# Patient Record
Sex: FEMALE | Race: WHITE | NOT HISPANIC OR LATINO
[De-identification: names, ages, dates, MRNs, and addresses within clinical notes are randomized per-mention and may not be internally consistent; named-entity substitution may affect disease eponyms.]

---

## 2018-04-16 ENCOUNTER — RX RENEWAL (OUTPATIENT)
Age: 28
End: 2018-04-16

## 2018-04-16 PROBLEM — Z00.00 ENCOUNTER FOR PREVENTIVE HEALTH EXAMINATION: Status: ACTIVE | Noted: 2018-04-16

## 2018-04-16 RX ORDER — NORGESTIMATE AND ETHINYL ESTRADIOL 7DAYSX3 LO
0.18/0.215/0.25 KIT ORAL
Qty: 84 | Refills: 1 | Status: ACTIVE | COMMUNITY
Start: 2018-04-16 | End: 1900-01-01

## 2018-12-13 ENCOUNTER — APPOINTMENT (OUTPATIENT)
Dept: OBGYN | Facility: CLINIC | Age: 28
End: 2018-12-13

## 2021-01-10 ENCOUNTER — TRANSCRIPTION ENCOUNTER (OUTPATIENT)
Age: 31
End: 2021-01-10

## 2022-04-12 ENCOUNTER — INPATIENT (INPATIENT)
Facility: HOSPITAL | Age: 32
LOS: 0 days | Discharge: HOME | End: 2022-04-13
Attending: STUDENT IN AN ORGANIZED HEALTH CARE EDUCATION/TRAINING PROGRAM | Admitting: STUDENT IN AN ORGANIZED HEALTH CARE EDUCATION/TRAINING PROGRAM
Payer: COMMERCIAL

## 2022-04-12 VITALS
RESPIRATION RATE: 17 BRPM | HEART RATE: 145 BPM | WEIGHT: 250 LBS | DIASTOLIC BLOOD PRESSURE: 90 MMHG | SYSTOLIC BLOOD PRESSURE: 140 MMHG | OXYGEN SATURATION: 99 % | TEMPERATURE: 99 F

## 2022-04-12 LAB
ALBUMIN SERPL ELPH-MCNC: 4.7 G/DL — SIGNIFICANT CHANGE UP (ref 3.5–5.2)
ALP SERPL-CCNC: 115 U/L — SIGNIFICANT CHANGE UP (ref 30–115)
ALT FLD-CCNC: 18 U/L — SIGNIFICANT CHANGE UP (ref 0–41)
ANION GAP SERPL CALC-SCNC: 16 MMOL/L — HIGH (ref 7–14)
AST SERPL-CCNC: 15 U/L — SIGNIFICANT CHANGE UP (ref 0–41)
B-OH-BUTYR SERPL-SCNC: 1.2 MMOL/L — HIGH
BASOPHILS # BLD AUTO: 0.01 K/UL — SIGNIFICANT CHANGE UP (ref 0–0.2)
BASOPHILS NFR BLD AUTO: 0.1 % — SIGNIFICANT CHANGE UP (ref 0–1)
BILIRUB SERPL-MCNC: 0.4 MG/DL — SIGNIFICANT CHANGE UP (ref 0.2–1.2)
BUN SERPL-MCNC: 9 MG/DL — LOW (ref 10–20)
CALCIUM SERPL-MCNC: 9.8 MG/DL — SIGNIFICANT CHANGE UP (ref 8.5–10.1)
CHLORIDE SERPL-SCNC: 98 MMOL/L — SIGNIFICANT CHANGE UP (ref 98–110)
CO2 SERPL-SCNC: 20 MMOL/L — SIGNIFICANT CHANGE UP (ref 17–32)
CREAT SERPL-MCNC: 0.7 MG/DL — SIGNIFICANT CHANGE UP (ref 0.7–1.5)
EGFR: 120 ML/MIN/1.73M2 — SIGNIFICANT CHANGE UP
EOSINOPHIL # BLD AUTO: 0.03 K/UL — SIGNIFICANT CHANGE UP (ref 0–0.7)
EOSINOPHIL NFR BLD AUTO: 0.2 % — SIGNIFICANT CHANGE UP (ref 0–8)
GLUCOSE SERPL-MCNC: 360 MG/DL — HIGH (ref 70–99)
HCG SERPL QL: NEGATIVE — SIGNIFICANT CHANGE UP
HCT VFR BLD CALC: 38.6 % — SIGNIFICANT CHANGE UP (ref 37–47)
HGB BLD-MCNC: 12.1 G/DL — SIGNIFICANT CHANGE UP (ref 12–16)
IMM GRANULOCYTES NFR BLD AUTO: 0.3 % — SIGNIFICANT CHANGE UP (ref 0.1–0.3)
LACTATE SERPL-SCNC: 1.4 MMOL/L — SIGNIFICANT CHANGE UP (ref 0.7–2)
LYMPHOCYTES # BLD AUTO: 1.74 K/UL — SIGNIFICANT CHANGE UP (ref 1.2–3.4)
LYMPHOCYTES # BLD AUTO: 14.4 % — LOW (ref 20.5–51.1)
MCHC RBC-ENTMCNC: 23.9 PG — LOW (ref 27–31)
MCHC RBC-ENTMCNC: 31.3 G/DL — LOW (ref 32–37)
MCV RBC AUTO: 76.1 FL — LOW (ref 81–99)
MONOCYTES # BLD AUTO: 0.66 K/UL — HIGH (ref 0.1–0.6)
MONOCYTES NFR BLD AUTO: 5.4 % — SIGNIFICANT CHANGE UP (ref 1.7–9.3)
NEUTROPHILS # BLD AUTO: 9.64 K/UL — HIGH (ref 1.4–6.5)
NEUTROPHILS NFR BLD AUTO: 79.6 % — HIGH (ref 42.2–75.2)
NRBC # BLD: 0 /100 WBCS — SIGNIFICANT CHANGE UP (ref 0–0)
PLATELET # BLD AUTO: 288 K/UL — SIGNIFICANT CHANGE UP (ref 130–400)
POTASSIUM SERPL-MCNC: 4.1 MMOL/L — SIGNIFICANT CHANGE UP (ref 3.5–5)
POTASSIUM SERPL-SCNC: 4.1 MMOL/L — SIGNIFICANT CHANGE UP (ref 3.5–5)
PROT SERPL-MCNC: 7.5 G/DL — SIGNIFICANT CHANGE UP (ref 6–8)
RBC # BLD: 5.07 M/UL — SIGNIFICANT CHANGE UP (ref 4.2–5.4)
RBC # FLD: 17.1 % — HIGH (ref 11.5–14.5)
SODIUM SERPL-SCNC: 134 MMOL/L — LOW (ref 135–146)
WBC # BLD: 12.12 K/UL — HIGH (ref 4.8–10.8)
WBC # FLD AUTO: 12.12 K/UL — HIGH (ref 4.8–10.8)

## 2022-04-12 PROCEDURE — 99285 EMERGENCY DEPT VISIT HI MDM: CPT

## 2022-04-12 PROCEDURE — 93010 ELECTROCARDIOGRAM REPORT: CPT

## 2022-04-12 PROCEDURE — 99221 1ST HOSP IP/OBS SF/LOW 40: CPT | Mod: GC

## 2022-04-12 RX ORDER — LANOLIN ALCOHOL/MO/W.PET/CERES
3 CREAM (GRAM) TOPICAL AT BEDTIME
Refills: 0 | Status: DISCONTINUED | OUTPATIENT
Start: 2022-04-12 | End: 2022-04-13

## 2022-04-12 RX ORDER — METRONIDAZOLE 500 MG
500 TABLET ORAL ONCE
Refills: 0 | Status: COMPLETED | OUTPATIENT
Start: 2022-04-12 | End: 2022-04-12

## 2022-04-12 RX ORDER — VANCOMYCIN HCL 1 G
1000 VIAL (EA) INTRAVENOUS ONCE
Refills: 0 | Status: COMPLETED | OUTPATIENT
Start: 2022-04-12 | End: 2022-04-12

## 2022-04-12 RX ORDER — SODIUM CHLORIDE 9 MG/ML
1000 INJECTION, SOLUTION INTRAVENOUS ONCE
Refills: 0 | Status: COMPLETED | OUTPATIENT
Start: 2022-04-12 | End: 2022-04-12

## 2022-04-12 RX ORDER — METFORMIN HYDROCHLORIDE 850 MG/1
500 TABLET ORAL ONCE
Refills: 0 | Status: COMPLETED | OUTPATIENT
Start: 2022-04-12 | End: 2022-04-12

## 2022-04-12 RX ORDER — DEXTROSE 50 % IN WATER 50 %
25 SYRINGE (ML) INTRAVENOUS ONCE
Refills: 0 | Status: DISCONTINUED | OUTPATIENT
Start: 2022-04-12 | End: 2022-04-13

## 2022-04-12 RX ORDER — SODIUM CHLORIDE 9 MG/ML
1000 INJECTION, SOLUTION INTRAVENOUS
Refills: 0 | Status: DISCONTINUED | OUTPATIENT
Start: 2022-04-12 | End: 2022-04-13

## 2022-04-12 RX ORDER — INSULIN GLARGINE 100 [IU]/ML
21 INJECTION, SOLUTION SUBCUTANEOUS AT BEDTIME
Refills: 0 | Status: DISCONTINUED | OUTPATIENT
Start: 2022-04-12 | End: 2022-04-13

## 2022-04-12 RX ORDER — GLUCAGON INJECTION, SOLUTION 0.5 MG/.1ML
1 INJECTION, SOLUTION SUBCUTANEOUS ONCE
Refills: 0 | Status: DISCONTINUED | OUTPATIENT
Start: 2022-04-12 | End: 2022-04-13

## 2022-04-12 RX ORDER — DEXTROSE 50 % IN WATER 50 %
15 SYRINGE (ML) INTRAVENOUS ONCE
Refills: 0 | Status: DISCONTINUED | OUTPATIENT
Start: 2022-04-12 | End: 2022-04-13

## 2022-04-12 RX ORDER — ACETAMINOPHEN 500 MG
650 TABLET ORAL EVERY 6 HOURS
Refills: 0 | Status: DISCONTINUED | OUTPATIENT
Start: 2022-04-12 | End: 2022-04-13

## 2022-04-12 RX ORDER — KETOROLAC TROMETHAMINE 30 MG/ML
15 SYRINGE (ML) INJECTION ONCE
Refills: 0 | Status: DISCONTINUED | OUTPATIENT
Start: 2022-04-12 | End: 2022-04-12

## 2022-04-12 RX ORDER — ONDANSETRON 8 MG/1
4 TABLET, FILM COATED ORAL EVERY 8 HOURS
Refills: 0 | Status: DISCONTINUED | OUTPATIENT
Start: 2022-04-12 | End: 2022-04-13

## 2022-04-12 RX ORDER — INSULIN LISPRO 100/ML
8 VIAL (ML) SUBCUTANEOUS
Refills: 0 | Status: DISCONTINUED | OUTPATIENT
Start: 2022-04-12 | End: 2022-04-13

## 2022-04-12 RX ORDER — SODIUM CHLORIDE 9 MG/ML
1000 INJECTION INTRAMUSCULAR; INTRAVENOUS; SUBCUTANEOUS
Refills: 0 | Status: DISCONTINUED | OUTPATIENT
Start: 2022-04-12 | End: 2022-04-13

## 2022-04-12 RX ORDER — VANCOMYCIN HCL 1 G
1750 VIAL (EA) INTRAVENOUS EVERY 12 HOURS
Refills: 0 | Status: DISCONTINUED | OUTPATIENT
Start: 2022-04-12 | End: 2022-04-13

## 2022-04-12 RX ORDER — METFORMIN HYDROCHLORIDE 850 MG/1
1 TABLET ORAL
Qty: 0 | Refills: 0 | DISCHARGE

## 2022-04-12 RX ORDER — HEPARIN SODIUM 5000 [USP'U]/ML
5000 INJECTION INTRAVENOUS; SUBCUTANEOUS EVERY 8 HOURS
Refills: 0 | Status: DISCONTINUED | OUTPATIENT
Start: 2022-04-12 | End: 2022-04-13

## 2022-04-12 RX ORDER — INSULIN LISPRO 100/ML
VIAL (ML) SUBCUTANEOUS
Refills: 0 | Status: DISCONTINUED | OUTPATIENT
Start: 2022-04-12 | End: 2022-04-13

## 2022-04-12 RX ORDER — CEFEPIME 1 G/1
1000 INJECTION, POWDER, FOR SOLUTION INTRAMUSCULAR; INTRAVENOUS ONCE
Refills: 0 | Status: COMPLETED | OUTPATIENT
Start: 2022-04-12 | End: 2022-04-12

## 2022-04-12 RX ADMIN — CEFEPIME 100 MILLIGRAM(S): 1 INJECTION, POWDER, FOR SOLUTION INTRAMUSCULAR; INTRAVENOUS at 20:47

## 2022-04-12 RX ADMIN — Medication 100 MILLIGRAM(S): at 20:48

## 2022-04-12 RX ADMIN — METFORMIN HYDROCHLORIDE 500 MILLIGRAM(S): 850 TABLET ORAL at 22:51

## 2022-04-12 RX ADMIN — SODIUM CHLORIDE 1000 MILLILITER(S): 9 INJECTION, SOLUTION INTRAVENOUS at 20:48

## 2022-04-12 RX ADMIN — Medication 15 MILLIGRAM(S): at 20:48

## 2022-04-12 RX ADMIN — Medication 250 MILLIGRAM(S): at 20:48

## 2022-04-12 RX ADMIN — Medication 15 MILLIGRAM(S): at 21:18

## 2022-04-12 NOTE — ED PROVIDER NOTE - NS ED ATTENDING STATEMENT MOD
This was a shared visit with the DASHA. I reviewed and verified the documentation and independently performed the documented:

## 2022-04-12 NOTE — H&P ADULT - ATTENDING COMMENTS
HPI:  31 F pmhx DM and hydradenitis suppurativa w/ recurrent abscesses presents to ED for fever and worsening redness to R breast. s/p R breast I&D by Dermatologist Dr. Garcia earlier today. Also had cortisone injection into scar tissue of an old abscess scar of L breast 4 o-clock position.  Pt states she was started on doxycycline and she has taken 1 dose today. States her erythema is now extending up to areola. Denies chills, chest pain, palpitation, sob, abd pain, n/v/d/, black stool/bloody stool/urinary sxs.    In the ED, pt hemodynamically stable, HR 140s. Labs significant for WBC 12,     s/p cefepime, metronidazole, flagyl in the ED (12 Apr 2022 22:50)    REVIEW OF SYSTEMS: see cc/HPI   CONSTITUTIONAL: No weakness, fevers or chills  EYES/ENT: No visual changes;  No vertigo or throat pain   NECK: No pain or stiffness  RESPIRATORY: No cough, wheezing, hemoptysis; No shortness of breath  CARDIOVASCULAR: No chest pain or palpitations  GASTROINTESTINAL: No abdominal or epigastric pain. No nausea, vomiting, or hematemesis; No diarrhea or constipation. No melena or hematochezia.  GENITOURINARY: No dysuria, frequency or hematuria  NEUROLOGICAL: No numbness or weakness  SKIN: No itching, (+) rashes, see cc/HPI     Physical Exam:  General: WN/WD NAD  Neurology: A&Ox3, nonfocal, follows commands  Eyes: PERRLA/ EOMI  ENT/Neck: Neck supple, trachea midline, No JVD  Respiratory: CTA B/L, No wheezing, rales, rhonchi  CV: Normal rate regular rhythm, S1S2, no murmurs, rubs or gallops  Abdominal: Soft, NT, ND +BS,   Extremities: No edema, + peripheral pulses  Skin: #########, Hematoma, Ecchymosis  Incisions:   Tubes: HPI:  31 F pmhx DM and hydradenitis suppurativa w/ recurrent abscesses presents to ED for fever and worsening redness to R breast. s/p R breast I&D by Dermatologist Dr. Garcia earlier today. Also had cortisone injection into scar tissue of an old abscess scar of L breast 4 o-clock position.  Pt states she was started on doxycycline and she has taken 1 dose today. States her erythema is now extending up to areola. Denies chills, chest pain, palpitation, sob, abd pain, n/v/d/, black stool/bloody stool/urinary sxs.    In the ED, pt hemodynamically stable, HR 140s. Labs significant for WBC 12,     s/p cefepime, metronidazole, flagyl in the ED (12 Apr 2022 22:50)    REVIEW OF SYSTEMS: see cc/HPI   CONSTITUTIONAL: No weakness, fevers or chills  EYES/ENT: No visual changes;  No vertigo or throat pain   NECK: No pain or stiffness  RESPIRATORY: No cough, wheezing, hemoptysis; No shortness of breath  CARDIOVASCULAR: No chest pain or palpitations  GASTROINTESTINAL: No abdominal or epigastric pain. No nausea, vomiting, or hematemesis; No diarrhea or constipation. No melena or hematochezia.  GENITOURINARY: No dysuria, frequency or hematuria  NEUROLOGICAL: No numbness or weakness  SKIN: No itching, (+) rashes, see cc/HPI     Physical Exam:  General: WN/WD NAD  Neurology: A&Ox3, nonfocal, follows commands  Eyes: PERRLA/ EOMI  ENT/Neck: Neck supple, trachea midline, No JVD  Respiratory: CTA B/L, No wheezing, rales, rhonchi  CV: Normal rate regular rhythm, S1S2, no murmurs, rubs or gallops  Abdominal: Soft, NT, ND +BS,   Extremities: No edema, + peripheral pulses  Skin: R breast abscess s/p I&D  w/overlying/surrounding erythema, mild purulent drainage from I&D site, no large discrete remaining fluctuance. Bilateral inguinal/ groin pustules and scars. No Hematoma, No Ecchymosis  Incisions:   Tubes:    A/p  Sepsis / R breast abscess s/p drainage   Cellulitis   Hydradenitis suppurativa   - IV abx  -warm compresses   -Hibiclens wash   -agree w/ ID and Burn consult   -check blood Cx/ ESR/ CRP    DM type II -uncontrolled   -Lantus HS / Lispro AC w/ F/s monitoring and sliding scale   -life style modification and weight loss  -dietary eval     DVT prophylaxis HPI:  31 F pmhx DM and hydradenitis suppurativa w/ recurrent abscesses presents to ED for fever and worsening redness to R breast. s/p R breast I&D by Dermatologist Dr. Garcia earlier today. Also had cortisone injection into scar tissue of an old abscess scar of L breast 4 o-clock position.  Pt states she was started on doxycycline and she has taken 1 dose today. States her erythema is now extending up to areola. Denies chills, chest pain, palpitation, sob, abd pain, n/v/d/, black stool/bloody stool/urinary sxs.    In the ED, pt hemodynamically stable, HR 140s. Labs significant for WBC 12,     s/p cefepime, metronidazole, flagyl in the ED (12 Apr 2022 22:50)    REVIEW OF SYSTEMS: see cc/HPI   CONSTITUTIONAL: No weakness, fevers or chills  EYES/ENT: No visual changes;  No vertigo or throat pain   NECK: No pain or stiffness  RESPIRATORY: No cough, wheezing, hemoptysis; No shortness of breath  CARDIOVASCULAR: No chest pain or palpitations  GASTROINTESTINAL: No abdominal or epigastric pain. No nausea, vomiting, or hematemesis; No diarrhea or constipation. No melena or hematochezia.  GENITOURINARY: No dysuria, frequency or hematuria  NEUROLOGICAL: No numbness or weakness  SKIN: No itching, (+) rashes, see cc/HPI     Physical Exam:  General: WN/WD NAD  Neurology: A&Ox3, nonfocal, follows commands  Eyes: PERRLA/ EOMI  ENT/Neck: Neck supple, trachea midline, No JVD  Respiratory: CTA B/L, No wheezing, rales, rhonchi  CV: Normal rate regular rhythm, S1S2, no murmurs, rubs or gallops  Abdominal: Soft, NT, ND +BS,   Extremities: No edema, + peripheral pulses  Skin: R breast abscess s/p I&D  w/overlying/surrounding erythema, mild purulent drainage from I&D site, no large discrete remaining fluctuance. Bilateral inguinal/ groin pustules and scars. No Hematoma, No Ecchymosis  Incisions:   Tubes:    A/p  Sepsis / R breast abscess s/p drainage   Cellulitis   Hydradenitis suppurativa   - IV abx  -warm compresses   -Hibiclens wash   -agree w/ ID and Burn consult   -check blood Cx/ ESR/ CRP    DM type II -uncontrolled   -Lantus HS / Lispro AC w/ F/s monitoring and sliding scale   -life style modification and weight loss  -dietary eval     DVT prophylaxis    SEEN by ATTENDING 4/12/22 ( note revised 4/13)

## 2022-04-12 NOTE — H&P ADULT - ASSESSMENT
31 F pmhx DM and hydradenitis suppurativa w/ recurrent abscesses presents to ED for fever and worsening redness to R breast. s/p R breast I&D by Dermatologist Dr. Garcia earlier today. Also had cortisone injection into scar tissue of an old abscess scar of L breast 4 o-clock position.  Pt states she was started on doxycycline and she has taken 1 dose today. States her erythema is now extending up to areola. Denies chills, chest pain, palpitation, sob, abd pain, n/v/d/, black stool/bloody stool/urinary sxs.    In the ED, pt hemodynamically stable, HR 140s. Labs significant for WBC 12,     s/p cefepime, metronidazole, flagyl in the ED    #R breast abscess and cellulitis   s/p InD earlier today at the derm office   Sepsis POA   Sinus tachycardia   start vancomycin  Obtain trough before 4th dose   f/u ID consult   f/u burn consult   Pain control prn   Zofran for nausea PRN  Gentle hydration   Local wound care for now   f/u Bcx/Wound cx   f/u ESR/CRP  f/u A1c    #DM  Keep FS <180  f/u HbA1C  start insulin basal/bolus/SS    #DVT PPX : Heparin subq  #Diet: CC  #GI PPX: Not indicated   #Activity: IAT       31 F pmhx DM and hydradenitis suppurativa w/ recurrent abscesses presents to ED for fever and worsening redness to R breast. s/p R breast I&D by Dermatologist Dr. Garcia earlier today. Also had cortisone injection into scar tissue of an old abscess scar of L breast 4 o-clock position.  Pt states she was started on doxycycline and she has taken 1 dose today. States her erythema is now extending up to areola. Denies chills, chest pain, palpitation, sob, abd pain, n/v/d/, black stool/bloody stool/urinary sxs.    In the ED, pt hemodynamically stable, HR 140s. Labs significant for WBC 12,     s/p cefepime, metronidazole, flagyl in the ED    #R breast abscess and cellulitis   s/p InD earlier today at the derm office   Sepsis POA   Sinus tachycardia   start vancomycin  Obtain trough before 4th dose   f/u ID consult   Pain control prn   Zofran for nausea PRN  Gentle hydration   Local wound care for now   f/u Bcx  f/u ESR/CRP  f/u A1c    #DM  Hyperglycemia on admission   Keep FS <180  HbA1C - 5.5 last month per patient   start insulin basal/bolus/SS    #DVT PPX : Heparin subq  #Diet: CC  #GI PPX: Not indicated   #Activity: IAT

## 2022-04-12 NOTE — ED PROVIDER NOTE - PROGRESS NOTE DETAILS
plan for admission for IV abx, pt agrees to plan. has needed admission in the past given poorly controlled DM and failure of outpt abx

## 2022-04-12 NOTE — ED PROVIDER NOTE - PHYSICAL EXAMINATION
CONSTITUTIONAL: Well-appearing; well-nourished; in no apparent distress.   NECK: Supple; non-tender; no cervical lymphadenopathy.   CARDIOVASCULAR: Normal S1, S2; no murmurs, rubs, or gallops.   RESPIRATORY: Normal chest excursion with respiration; breath sounds clear and equal bilaterally; no wheezes, rhonchi, or rales.  GI/: non-distended; non-tender; no palpable organomegaly.   MS: Normal ROM in all four extremities; non-tender to palpation; distal pulses are normal.   SKIN: cellultiis to inferior R breast with small abscess post incision/drainage; small older abscess s/p I&D to L lateral breast; otherwise normal for age and race; warm; dry; good turgor; no apparent lesions or exudate.   NEURO/PSYCH: A & O x 4; grossly unremarkable. mood and manner are appropriate.

## 2022-04-12 NOTE — ED ADULT NURSE NOTE - OBJECTIVE STATEMENT
pt has hx of hydradenitis, pt had a boil under her right breast lanced today, was put on doxy. spiked a fever tmax 103 at home, took tylenol, fever came down. came to ed for chest pain and palpitations, was tachy to 140s in triage.

## 2022-04-12 NOTE — ED PROVIDER NOTE - NS ED ROS FT
Constitutional: no recent weight loss, change in appetite or malaise  Eyes: no redness/discharge/pain/vision changes  ENT: no rhinorrhea/ear pain/sore throat  Cardiac: No chest pain, SOB or edema.  Respiratory: No cough or respiratory distress  GI: No nausea, vomiting, diarrhea or abdominal pain.  : No dysuria, frequency, urgency or hematuria  MS: no pain to back or extremities, no loss of ROM, no weakness  Neuro: No headache or weakness. No LOC.  Except as documented in the HPI, all other systems are negative.

## 2022-04-12 NOTE — H&P ADULT - NSHPLABSRESULTS_GEN_ALL_CORE
12.1   12.12 )-----------( 288      ( 12 Apr 2022 20:24 )             38.6       04-12    134<L>  |  98  |  9<L>  ----------------------------<  360<H>  4.1   |  20  |  0.7    Ca    9.8      12 Apr 2022 20:24    TPro  7.5  /  Alb  4.7  /  TBili  0.4  /  DBili  x   /  AST  15  /  ALT  18  /  AlkPhos  115  04-12                      Lactate Trend  04-12 @ 20:24 Lactate:1.4             CAPILLARY BLOOD GLUCOSE

## 2022-04-12 NOTE — ED PROVIDER NOTE - OBJECTIVE STATEMENT
pt with pmhx DM and hydradenitis suppertiva presents to ED for fever and worsening redness to R breast. reports was seen at derm office for I&D of small inferior breast abscess today and started on doxy. at that time, only had a small area of erythema around the abscess, now extending up to areola. Denies chill/HA/dizziness/chest pain/palpitation/sob/abd pain/n/v/d/ black stool/bloody stool/urinary sxs

## 2022-04-12 NOTE — H&P ADULT - HISTORY OF PRESENT ILLNESS
31 F pmhx DM and hydradenitis suppurativa w/ recurrent abscesses presents to ED for fever and worsening redness to R breast. s/p R breast I&D by Dermatologist Dr. Garcia earlier today. Also had cortisone injection into scar tissue of an old abscess scar of L breast 4 o-clock position.  Pt states she was started on doxycycline and she has taken 1 dose today. States her erythema is now extending up to areola. Denies chills, chest pain, palpitation, sob, abd pain, n/v/d/, black stool/bloody stool/urinary sxs.    In the ED, pt hemodynamically stable, HR 140s. Labs significant for WBC 12,     s/p cefepime, metronidazole, flagyl in the ED

## 2022-04-12 NOTE — ED PROVIDER NOTE - ATTENDING CONTRIBUTION TO CARE
31F pmh DM, HS w/recurrent abscesses p/w fever s/p R breast I&D by Derm Dr. Garcia earlier today. Has abscess to 4 o'clock position I&D today, also had cortisone injection into scar tissue of an old abscess scar of L breast 4 o-clock position. Subsequently dvlpd fever @ home this evening, as well as increasing surrounding erythema near R breast abscess, took tylenol 1 gm @ 6:30pm. Also took 1 dose of doxy po earlier today as prescribed by Dermatologist. Denies uri sx, cough, nv, abd pain. PCP Rj.    PE:  nad  skin- see chest exam; otherwise warm, dry  ncat  neck supple  chest- R breast abscess s/p I&D 4 o'clock position w/overlying/surrounding cellulitis, mild purulent drainage from I&D site, no large discrete remaining fluctuance, L breast old abscess site 4 o'clock position, mild overlying desquamation & erythema, no drainage or cellulitiis; no obvious axillary or SC LAD bl  tachy 120s reg rhythm nl s1s2 no mrg  ctab no wrr  abd soft ntnd no palpable masses no rgr  back non-tender no cvat  ext no cce dpi  neuro aaox3 grossly nf exam

## 2022-04-12 NOTE — ED ADULT NURSE NOTE - DRUG PRE-SCREENING (DAST -1)
Problem: Perioperative Period (Adult)  Goal: Signs and Symptoms of Listed Potential Problems Will be Absent or Manageable (Perioperative Period)  Outcome: Ongoing (interventions implemented as appropriate)    12/01/17 0975   Perioperative Period   Problems Assessed (Perioperative Period) all   Problems Present (Perioperative Period) none            Statement Selected

## 2022-04-12 NOTE — ED PROVIDER NOTE - CLINICAL SUMMARY MEDICAL DECISION MAKING FREE TEXT BOX
R breast abscess & cellulitis - febrile after I&D and 1 dose po abx earlier today, tachy to 130s - iv abx, admitted for further mgmt, US, inpatient surg consult as needed

## 2022-04-12 NOTE — H&P ADULT - NSHPPHYSICALEXAM_GEN_ALL_CORE
GENERAL: NAD, speaks in full sentences, no signs of respiratory distress  HEAD:  Atraumatic, Normocephalic  EYES: EOMI, PERRLA, conjunctiva and sclera clear  NECK: Supple, No JVD  CHEST/LUNG: Clear to auscultation bilaterally; No wheeze; No crackles; No accessory muscles used  HEART: Regular rate and rhythm; No murmurs;   ABDOMEN: Soft, Nontender, Nondistended; Bowel sounds present; No guarding  EXTREMITIES:  2+ Peripheral Pulses, No cyanosis or edema  PSYCH: AAOx3  NEUROLOGY: non-focal  SKIN: R breast abscess s/p I&D  w/overlying/surrounding cellulitis, mild purulent drainage from I&D site, no large discrete remaining fluctuance GENERAL: NAD, speaks in full sentences, no signs of respiratory distress  HEAD:  Atraumatic, Normocephalic  EYES: EOMI, PERRLA, conjunctiva and sclera clear  NECK: Supple, No JVD  CHEST/LUNG: Clear to auscultation bilaterally; No wheeze; No crackles; No accessory muscles used  HEART: Regular rate and rhythm; No murmurs;   ABDOMEN: Soft, Nontender, Nondistended; Bowel sounds present; No guarding  EXTREMITIES:  2+ Peripheral Pulses, No cyanosis or edema  PSYCH: AAOx3  NEUROLOGY: non-focal  SKIN: R breast abscess s/p I&D  w/overlying/surrounding erythema, mild purulent drainage from I&D site, no large discrete remaining fluctuance

## 2022-04-12 NOTE — ED ADULT NURSE NOTE - CHIEF COMPLAINT QUOTE
pt states she had boil lanced on her right breast today as well as a cortisone shot her other breast. pt states she had fever 103 at home took 2 extra strength Tylenol around 6pm. pt c/o left breast pain and feels like her heart is racing.

## 2022-04-13 ENCOUNTER — TRANSCRIPTION ENCOUNTER (OUTPATIENT)
Age: 32
End: 2022-04-13

## 2022-04-13 VITALS
RESPIRATION RATE: 18 BRPM | SYSTOLIC BLOOD PRESSURE: 128 MMHG | DIASTOLIC BLOOD PRESSURE: 74 MMHG | HEART RATE: 111 BPM | TEMPERATURE: 97 F

## 2022-04-13 LAB
A1C WITH ESTIMATED AVERAGE GLUCOSE RESULT: 9.2 % — HIGH (ref 4–5.6)
ALBUMIN SERPL ELPH-MCNC: 3.8 G/DL — SIGNIFICANT CHANGE UP (ref 3.5–5.2)
ALP SERPL-CCNC: 90 U/L — SIGNIFICANT CHANGE UP (ref 30–115)
ALT FLD-CCNC: 14 U/L — SIGNIFICANT CHANGE UP (ref 0–41)
ANION GAP SERPL CALC-SCNC: 15 MMOL/L — HIGH (ref 7–14)
AST SERPL-CCNC: 10 U/L — SIGNIFICANT CHANGE UP (ref 0–41)
BILIRUB SERPL-MCNC: 0.4 MG/DL — SIGNIFICANT CHANGE UP (ref 0.2–1.2)
BUN SERPL-MCNC: 8 MG/DL — LOW (ref 10–20)
CALCIUM SERPL-MCNC: 8.8 MG/DL — SIGNIFICANT CHANGE UP (ref 8.5–10.1)
CHLORIDE SERPL-SCNC: 98 MMOL/L — SIGNIFICANT CHANGE UP (ref 98–110)
CO2 SERPL-SCNC: 20 MMOL/L — SIGNIFICANT CHANGE UP (ref 17–32)
CREAT SERPL-MCNC: 0.5 MG/DL — LOW (ref 0.7–1.5)
EGFR: 130 ML/MIN/1.73M2 — SIGNIFICANT CHANGE UP
ESTIMATED AVERAGE GLUCOSE: 217 MG/DL — HIGH (ref 68–114)
GLUCOSE BLDC GLUCOMTR-MCNC: 287 MG/DL — HIGH (ref 70–99)
GLUCOSE BLDC GLUCOMTR-MCNC: 306 MG/DL — HIGH (ref 70–99)
GLUCOSE BLDC GLUCOMTR-MCNC: 341 MG/DL — HIGH (ref 70–99)
GLUCOSE SERPL-MCNC: 328 MG/DL — HIGH (ref 70–99)
HCT VFR BLD CALC: 31.2 % — LOW (ref 37–47)
HGB BLD-MCNC: 10.1 G/DL — LOW (ref 12–16)
LACTATE SERPL-SCNC: 0.9 MMOL/L — SIGNIFICANT CHANGE UP (ref 0.7–2)
MCHC RBC-ENTMCNC: 24.4 PG — LOW (ref 27–31)
MCHC RBC-ENTMCNC: 32.4 G/DL — SIGNIFICANT CHANGE UP (ref 32–37)
MCV RBC AUTO: 75.4 FL — LOW (ref 81–99)
MRSA PCR RESULT.: NEGATIVE — SIGNIFICANT CHANGE UP
NRBC # BLD: 0 /100 WBCS — SIGNIFICANT CHANGE UP (ref 0–0)
PLATELET # BLD AUTO: 240 K/UL — SIGNIFICANT CHANGE UP (ref 130–400)
POTASSIUM SERPL-MCNC: 3.7 MMOL/L — SIGNIFICANT CHANGE UP (ref 3.5–5)
POTASSIUM SERPL-SCNC: 3.7 MMOL/L — SIGNIFICANT CHANGE UP (ref 3.5–5)
PROT SERPL-MCNC: 6.2 G/DL — SIGNIFICANT CHANGE UP (ref 6–8)
RBC # BLD: 4.14 M/UL — LOW (ref 4.2–5.4)
RBC # FLD: 17.3 % — HIGH (ref 11.5–14.5)
SARS-COV-2 RNA SPEC QL NAA+PROBE: SIGNIFICANT CHANGE UP
SODIUM SERPL-SCNC: 133 MMOL/L — LOW (ref 135–146)
WBC # BLD: 10.42 K/UL — SIGNIFICANT CHANGE UP (ref 4.8–10.8)
WBC # FLD AUTO: 10.42 K/UL — SIGNIFICANT CHANGE UP (ref 4.8–10.8)

## 2022-04-13 PROCEDURE — 99239 HOSP IP/OBS DSCHRG MGMT >30: CPT

## 2022-04-13 RX ORDER — FLUCONAZOLE 150 MG/1
1 TABLET ORAL
Qty: 1 | Refills: 0
Start: 2022-04-13 | End: 2022-04-13

## 2022-04-13 RX ORDER — INSULIN LISPRO 100/ML
7 VIAL (ML) SUBCUTANEOUS ONCE
Refills: 0 | Status: COMPLETED | OUTPATIENT
Start: 2022-04-13 | End: 2022-04-13

## 2022-04-13 RX ORDER — INSULIN GLARGINE 100 [IU]/ML
5 INJECTION, SOLUTION SUBCUTANEOUS ONCE
Refills: 0 | Status: COMPLETED | OUTPATIENT
Start: 2022-04-13 | End: 2022-04-13

## 2022-04-13 RX ORDER — AZTREONAM 2 G
1 VIAL (EA) INJECTION
Qty: 14 | Refills: 0
Start: 2022-04-13 | End: 2022-04-19

## 2022-04-13 RX ADMIN — HEPARIN SODIUM 5000 UNIT(S): 5000 INJECTION INTRAVENOUS; SUBCUTANEOUS at 05:23

## 2022-04-13 RX ADMIN — Medication 8 UNIT(S): at 11:42

## 2022-04-13 RX ADMIN — Medication 250 MILLIGRAM(S): at 05:23

## 2022-04-13 RX ADMIN — SODIUM CHLORIDE 60 MILLILITER(S): 9 INJECTION INTRAMUSCULAR; INTRAVENOUS; SUBCUTANEOUS at 02:46

## 2022-04-13 RX ADMIN — Medication 6: at 11:42

## 2022-04-13 RX ADMIN — Medication 7 UNIT(S): at 01:00

## 2022-04-13 RX ADMIN — Medication 8: at 08:25

## 2022-04-13 RX ADMIN — Medication 8 UNIT(S): at 08:24

## 2022-04-13 RX ADMIN — INSULIN GLARGINE 5 UNIT(S): 100 INJECTION, SOLUTION SUBCUTANEOUS at 10:38

## 2022-04-13 NOTE — PROGRESS NOTE ADULT - ASSESSMENT
31 F pmhx type 2 DM and hydradenitis suppurativa w/ recurrent abscesses presents to ED for fever and worsening redness to R breast, s/p R breast I&D by Dermatologist Dr. Garcia prior to admission on the same day. Also had cortisone injection into scar tissue of an old abscess scar of L breast 4 o-clock position. Was started on doxycycline and took 1 dose, but began to have expanding erythema and fevers. Admitted for R breast abscess/cellulitis.     In the ED, pt hemodynamically stable, HR 140s. Labs significant for WBC 12,     s/p cefepime, metronidazole, flagyl in the ED    #R breast abscess and cellulitis   - s/p I&D in Derm office  - Sepsis POA, tachycardic on admission  - blood cultures x2 collected  - s/p cefepime, flagyl, vanc in the ED  - started on vancomycin 1750mg q12hrs   - ID consulted  - Obtain CRP,ESR  - Pain control prn   - Zofran for nausea PRN  - Local wound care for now     #DM  - Hyperglycemia on admission   - HbA1C - 5.5 last month per patient   - A1c this admission 9.2  - lantus 21 at bedtime, lispro 8 units with meals, ISS  - monitor FS, keep FS <180    Misc:  #DVT PPX : Heparin subq  #Diet: CC  #GI PPX: Not indicated   #Activity: IAT  #Code: Full Code  #Dispo: Acute   31 F pmhx type 2 DM and hydradenitis suppurativa w/ recurrent abscesses presents to ED for fever and worsening redness to R breast, s/p R breast I&D by Dermatologist Dr. Garcia prior to admission on the same day. Also had cortisone injection into scar tissue of an old abscess scar of L breast 4 o-clock position. Was started on doxycycline and took 1 dose, but began to have expanding erythema and fevers. Admitted for R breast abscess/cellulitis.     In the ED, pt hemodynamically stable, HR 140s. Labs significant for WBC 12,     s/p cefepime, metronidazole, flagyl in the ED    #R breast abscess and cellulitis   - s/p I&D in Derm office  - Sepsis POA, tachycardic on admission  - blood cultures x2 collected  - s/p cefepime, flagyl, vanc in the ED  - started on vancomycin 1750mg q12hrs   - ID consulted  - Obtain CRP,ESR  - vanco trough before 4th dose on 4/14 AM  - Pain control prn   - Zofran for nausea PRN  - Local wound care for now     #DM  - Hyperglycemia on admission  - on metformin 500mg BID at home  - HbA1C - 5.5 last month per patient   - A1c this admission 9.2, FS uncontrolled  - started on lantus 21 at bedtime, lispro 8 units with meals, ISS  - monitor FS, keep FS <180    Misc:  #DVT PPX : Heparin subq  #Diet: CC  #GI PPX: Not indicated   #Activity: IAT  #Code: Full Code  #Dispo: Acute

## 2022-04-13 NOTE — DISCHARGE NOTE PROVIDER - NSDCMRMEDTOKEN_GEN_ALL_CORE_FT
metFORMIN 500 mg oral tablet: 1 tab(s) orally 2 times a day   Bactrim  mg-160 mg oral tablet: 1 tab(s) orally 2 times a day   Diflucan 150 mg oral tablet: 1 tab(s) orally once a day   metFORMIN 500 mg oral tablet: 1 tab(s) orally 2 times a day

## 2022-04-13 NOTE — DISCHARGE NOTE PROVIDER - HOSPITAL COURSE
Subjective:     Roberto Marie is a 5 y o  male who is brought in for this well child visit  History provided by: father    Current Issues:  Current concerns: none  Well Child Assessment:  History was provided by the mother  Jose Way lives with his mother, father and sister  Nutrition  Types of intake include cereals, cow's milk, eggs, fish, fruits, meats, vegetables, juices, junk food and non-nutritional  Junk food includes candy, chips, desserts, fast food, soda and sugary drinks  Dental  The patient has a dental home  The patient brushes teeth regularly  The patient flosses regularly  Last dental exam was 6-12 months ago  Elimination  Elimination problems do not include constipation, diarrhea or urinary symptoms  Toilet training is complete  Sleep  The patient sleeps in his own bed  Average sleep duration is 8 hours  The patient does not snore  There are no sleep problems  Safety  There is smoking in the home  Home has working smoke alarms? yes  Home has working carbon monoxide alarms? yes  There is a gun in home (safely kept)  There is an appropriate car seat in use  Social  The caregiver enjoys the child  Childcare is provided at child's home and   The childcare provider is a parent  The child spends 5 days per week at   The child spends 8 hours per day at   Sibling interactions are good         Developmental 3 Years Appropriate     Question Response Comments    Child can stack 4 small (< 2") blocks without them falling Yes Yes on 5/20/2021 (Age - 6yrs)    Speaks in 2-word sentences Yes Yes on 5/20/2021 (Age - 6yrs)    Can identify at least 2 of pictures of cat, bird, horse, dog, person Yes Yes on 5/20/2021 (Age - 6yrs)    Throws ball overhand, straight, toward parent's stomach or chest from a distance of 5 feet Yes Yes on 5/20/2021 (Age - 6yrs)    Adequately follows instructions: 'put the paper on the floor; put the paper on the chair; give the paper to me' Yes Yes on 5/20/2021 (Age - 6yrs)    Copies a drawing of a straight vertical line Yes Yes on 5/20/2021 (Age - 6yrs)    Can jump over paper placed on floor (no running jump) Yes Yes on 5/20/2021 (Age - 6yrs)    Can put on own shoes Yes Yes on 5/20/2021 (Age - 6yrs)    Can pedal a tricycle at least 10 feet Yes Yes on 5/20/2021 (Age - 6yrs)      Developmental 4 Years Appropriate     Question Response Comments    Can wash and dry hands without help Yes Yes on 5/20/2021 (Age - 6yrs)    Correctly adds 's' to words to make them plural Yes Yes on 5/20/2021 (Age - 6yrs)    Can balance on 1 foot for 2 seconds or more given 3 chances Yes Yes on 5/20/2021 (Age - 6yrs)    Can copy a picture of a [place] Yes Yes on 5/20/2021 (Age - 6yrs)    Can stack 8 small (< 2") blocks without them falling Yes Yes on 5/20/2021 (Age - 6yrs)    Plays games involving taking turns and following rules (hide & seek,  & robbers, etc ) Yes Yes on 5/20/2021 (Age - 6yrs)    Can put on pants, shirt, dress, or socks without help (except help with snaps, buttons, and belts) No No on 5/20/2021 (Age - 6yrs)    Can say full name Yes Yes on 5/20/2021 (Age - 6yrs)               Objective:        Vitals:    05/21/21 1357   BP: (!) 90/58   Temp: 98 1 °F (36 7 °C)   TempSrc: Tympanic   Weight: 17 5 kg (38 lb 9 6 oz)   Height: 3' 3 25" (0 997 m)     Growth parameters are noted and are appropriate for age  Wt Readings from Last 1 Encounters:   05/21/21 17 5 kg (38 lb 9 6 oz) (70 %, Z= 0 54)*     * Growth percentiles are based on CDC (Boys, 2-20 Years) data  Ht Readings from Last 1 Encounters:   05/21/21 3' 3 25" (0 997 m) (24 %, Z= -0 71)*     * Growth percentiles are based on CDC (Boys, 2-20 Years) data  Body mass index is 17 62 kg/m²      Vitals:    05/21/21 1357   BP: (!) 90/58   Temp: 98 1 °F (36 7 °C)   TempSrc: Tympanic   Weight: 17 5 kg (38 lb 9 6 oz)   Height: 3' 3 25" (0 997 m)       No exam data present    Physical Exam  Vitals signs and nursing note reviewed  Constitutional:       General: He is active  He is not in acute distress  Appearance: He is well-developed  HENT:      Head: Normocephalic and atraumatic  Right Ear: Tympanic membrane, ear canal and external ear normal       Left Ear: Tympanic membrane, ear canal and external ear normal       Nose: Nose normal  No congestion or rhinorrhea  Mouth/Throat:      Mouth: Mucous membranes are moist       Pharynx: Oropharynx is clear  No oropharyngeal exudate or posterior oropharyngeal erythema  Eyes:      Extraocular Movements: Extraocular movements intact  Conjunctiva/sclera: Conjunctivae normal       Pupils: Pupils are equal, round, and reactive to light  Neck:      Musculoskeletal: Normal range of motion and neck supple  No neck rigidity  Cardiovascular:      Rate and Rhythm: Normal rate and regular rhythm  Pulses: Normal pulses  Heart sounds: Normal heart sounds  No murmur  Pulmonary:      Effort: Pulmonary effort is normal  No respiratory distress  Breath sounds: Normal breath sounds  Abdominal:      General: Abdomen is flat  Bowel sounds are normal  There is no distension  Palpations: Abdomen is soft  Tenderness: There is no abdominal tenderness  Genitourinary:     Rectum: Normal       Comments: Testes descended b/L  External genitalia normal   Lazaro I  Musculoskeletal: Normal range of motion  General: No swelling or tenderness  Lymphadenopathy:      Cervical: No cervical adenopathy  Skin:     General: Skin is warm and dry  Capillary Refill: Capillary refill takes less than 2 seconds  Findings: No rash  Neurological:      General: No focal deficit present  Mental Status: He is alert  Cranial Nerves: No cranial nerve deficit  Gait: Gait normal            Assessment:      Healthy 5 y o  male child  Normal growth and development  Due for proquad, quadracel, hep a #1  Dental UTD       1  Encounter for routine child health examination without abnormal findings     2  Encounter for immunization  MMR AND VARICELLA COMBINED VACCINE SQ (PROQUAD)    DTAP IPV COMBINED VACCINE IM (Quadracel)    HEPATITIS A VACCINE PEDIATRIC / ADOLESCENT 2 DOSE IM   3  Body mass index, pediatric, 85th percentile to less than 95th percentile for age     3  Exercise counseling     5  Nutritional counseling            Plan:          1  Anticipatory guidance discussed  Gave handout on well-child issues at this age  Nutrition and Exercise Counseling: The patient's Body mass index is 17 62 kg/m²  This is 93 %ile (Z= 1 51) based on CDC (Boys, 2-20 Years) BMI-for-age based on BMI available as of 5/21/2021  Nutrition counseling provided:  Anticipatory guidance for nutrition given and counseled on healthy eating habits  Exercise counseling provided:  Anticipatory guidance and counseling on exercise and physical activity given  2  Development: appropriate for age    1  Immunizations today: per orders  Vaccine Counseling: Discussed with: Ped parent/guardian: father  The benefits, contraindication and side effects for the following vaccines were reviewed: Immunization component list: Tetanus, Diphtheria, pertussis, IPV, Hep A, measles, mumps, rubella and varicella  4  Follow-up visit in 1 year for next well child visit, or sooner as needed  HPI:  31 F pmhx DM and hydradenitis suppurativa w/ recurrent abscesses presents to ED for fever and worsening redness to R breast. s/p R breast I&D by Dermatologist Dr. Garcia earlier today. Also had cortisone injection into scar tissue of an old abscess scar of L breast 4 o-clock position.  Pt states she was started on doxycycline and she has taken 1 dose today. States her erythema is now extending up to areola. Denies chills, chest pain, palpitation, sob, abd pain, n/v/d/, black stool/bloody stool/urinary sxs.    In the ED, pt hemodynamically stable, HR 140s. Labs significant for WBC 12,     s/p cefepime, metronidazole, flagyl in the ED     Hospital Course:    Patient admitted for R breast cellulitis. ID on board. Started on vancomycin 1750mg q12hrs. MRSA negative. 5mm circular shallow ulcer w/ minimal drainage (no pus) under R breast, skin indurated with very light erythema around ulcer. Patient will be send home to finish course of doxycycline and on PO bactrim DS BID x7days and 1x dose diflucan.      31 F pmhx type 2 DM and hydradenitis suppurativa w/ recurrent abscesses presents to ED for fever and worsening redness to R breast, s/p R breast I&D by Dermatologist Dr. Garcia prior to admission on the same day. Also had cortisone injection into scar tissue of an old abscess scar of L breast 4 o-clock position. Was started on doxycycline and took 1 dose, but began to have expanding erythema and fevers. Admitted for R breast abscess/cellulitis.     In the ED, pt hemodynamically stable, HR 140s. Labs significant for WBC 12,     s/p cefepime, metronidazole, flagyl in the ED    #R breast abscess and cellulitis   - s/p I&D in Derm office  - Sepsis POA, tachycardic on admission  - blood cultures x2 collected  - s/p cefepime, flagyl, vanc in the ED  - started on vancomycin 1750mg q12hrs   - ID consulted  - Obtain CRP,ESR  - vanco trough before 4th dose on 4/14 AM  - Pain control prn   - Zofran for nausea PRN  - Local wound care for now     #DM  - Hyperglycemia on admission  - on metformin 500mg BID at home  - HbA1C - 5.5 last month per patient   - A1c this admission 9.2, FS uncontrolled  - started on lantus 21 at bedtime, lispro 8 units with meals, ISS  - monitor FS, keep FS <180    Misc:  #DVT PPX : Heparin subq  #Diet: CC  #GI PPX: Not indicated   #Activity: IAT  #Code: Full Code  #Dispo: Acute

## 2022-04-13 NOTE — PROGRESS NOTE ADULT - SUBJECTIVE AND OBJECTIVE BOX
ABDONMARY JO  31y  Female  ***My note supersedes ALL resident notes that I sign.  My corrections for their notes are in my note.***    I can be reached directly on [id number]. My office number is [phone]. My personal cell number is [phone].    INTERVAL EVENTS: Here for f/u of rt breast infection. Pt has hx of recurrent infections 2/2 DM. Pt had recent birth of 2nd child 4 mo ago. Pt feels she may also have PCOS. Pt feels OK to go home today - has just minor pain rt breast.    T(F): 96.9 (04-13-22 @ 13:24), Max: 99.8 (04-13-22 @ 05:14)  HR: 111 (04-13-22 @ 13:24) (111 - 145)  BP: 128/74 (04-13-22 @ 13:24) (128/74 - 140/90)  RR: 18 (04-13-22 @ 13:24) (17 - 18)  SpO2: 98% (04-13-22 @ 01:40) (98% - 100%)    Gen: NAD  HEENT: PERRL, EOMI, mouth clr, nose clr  Neck: no nodes, no JVD, thyroid nl  chest: rt breast: under breast in center is 5mm circular shallow ulcer w/ min drainage (no pus); skin is indurated; no abscess; area is a little tender; min very light erythema around ulcer; overall minor infection  lt breast: lateral to areola is a recovering circular ulcer, which no longer bothers pt  lungs: clr  hrt: s1 s2 rrr no murmur  abd: soft, NT/ND, no HS megaly  ext: no edema, no c/c  neuro: aa ox3, cn intact, can move all 4 ext    LABS:                      10.1    (    75.4   10.42 )-----------( ---------      240      ( 13 Apr 2022 07:46 )             31.2    (    17.3     133   (   98   (   328      04-13-22 @ 07:46  ----------------------               3.7   (   20   (   8                             -----                        0.5  Ca  8.8   Mg  --    P   --     134   (   98   (   360      04-12-22 @ 20:24  ----------------------               4.1   (   20   (   9                             -----                        0.7  Ca  9.8   Mg  --    P   --     LFT  6.2  (  0.4  (  10       04-13-22 @ 07:46  -------------------------  3.8  (  90  (  14    CAPILLARY BLOOD GLUCOSE  POCT Blood Glucose.: 287 (04-13-22 @ 11:32)  POCT Blood Glucose.: 306 (04-13-22 @ 07:54)  POCT Blood Glucose.: 341 (04-13-22 @ 00:51)    RADIOLOGY & ADDITIONAL TESTS:    MEDICATIONS:  vancomycin  IVPB 1750 milliGRAM(s) IV Intermittent every 12 hours    acetaminophen     Tablet .. 650 milliGRAM(s) Oral every 6 hours PRN  aluminum hydroxide/magnesium hydroxide/simethicone Suspension 30 milliLiter(s) Oral every 4 hours PRN  heparin   Injectable 5000 Unit(s) SubCutaneous every 8 hours  insulin glargine Injectable (LANTUS) 21 Unit(s) SubCutaneous at bedtime  insulin lispro (ADMELOG) corrective regimen sliding scale   SubCutaneous Before meals and at bedtime  insulin lispro Injectable (ADMELOG) 8 Unit(s) SubCutaneous three times a day before meals  melatonin 3 milliGRAM(s) Oral at bedtime PRN  ondansetron Injectable 4 milliGRAM(s) IV Push every 8 hours PRN  sodium chloride 0.9%. 1000 milliLiter(s) IV Continuous <Continuous>  
MARY JO COPPOLA 31y Female  MRN#: [id number]     SUBJECTIVE  Patient is a 31y old Female who presents with a chief complaint of R breast erythema    Interval/Overnight Events:    Today is hospital day 1d, and this morning she is lying in bed without distress. Patient has been afebrile and comfortable on room air.   No acute overnight events.     OBJECTIVE  PAST MEDICAL & SURGICAL HISTORY  Diabetes mellitus    Hydradenitis      ALLERGIES:  No Known Allergies    MEDICATIONS:  STANDING MEDICATIONS  dextrose 5%. 1000 milliLiter(s) IV Continuous <Continuous>  dextrose 50% Injectable 25 Gram(s) IV Push once  glucagon  Injectable 1 milliGRAM(s) IntraMuscular once  heparin   Injectable 5000 Unit(s) SubCutaneous every 8 hours  insulin glargine Injectable (LANTUS) 21 Unit(s) SubCutaneous at bedtime  insulin lispro (ADMELOG) corrective regimen sliding scale   SubCutaneous Before meals and at bedtime  insulin lispro Injectable (ADMELOG) 8 Unit(s) SubCutaneous three times a day before meals  sodium chloride 0.9%. 1000 milliLiter(s) IV Continuous <Continuous>  vancomycin  IVPB 1750 milliGRAM(s) IV Intermittent every 12 hours    PRN MEDICATIONS  acetaminophen     Tablet .. 650 milliGRAM(s) Oral every 6 hours PRN  aluminum hydroxide/magnesium hydroxide/simethicone Suspension 30 milliLiter(s) Oral every 4 hours PRN  dextrose Oral Gel 15 Gram(s) Oral once PRN  melatonin 3 milliGRAM(s) Oral at bedtime PRN  ondansetron Injectable 4 milliGRAM(s) IV Push every 8 hours PRN    HOME MEDICATIONS  Home Medications:  metFORMIN 500 mg oral tablet: 1 tab(s) orally 2 times a day (12 Apr 2022 23:24)      LABS:                        10.1   10.42 )-----------( 240      ( 13 Apr 2022 07:46 )             31.2     04-13    133<L>  |  98  |  8<L>  ----------------------------<  328<H>  3.7   |  20  |  0.5<L>    Ca    8.8      13 Apr 2022 07:46    TPro  6.2  /  Alb  3.8  /  TBili  0.4  /  DBili  x   /  AST  10  /  ALT  14  /  AlkPhos  90  04-13    LIVER FUNCTIONS - ( 13 Apr 2022 07:46 )  Alb: 3.8 g/dL / Pro: 6.2 g/dL / ALK PHOS: 90 U/L / ALT: 14 U/L / AST: 10 U/L / GGT: x                 Lactate, Blood: 0.9 mmol/L (04-13-22 @ 07:46)  Lactate, Blood: 1.4 mmol/L (04-12-22 @ 20:24)          CAPILLARY BLOOD GLUCOSE      POCT Blood Glucose.: 287 mg/dL (13 Apr 2022 11:32)      PHYSICAL EXAM:  T(C): 37.7 (04-13-22 @ 05:14), Max: 37.7 (04-13-22 @ 05:14)  HR: 123 (04-13-22 @ 05:14) (123 - 145)  BP: 135/- (04-13-22 @ 05:14) (130/65 - 140/90)  RR: 17 (04-13-22 @ 01:40) (17 - 18)  SpO2: 98% (04-13-22 @ 01:40) (98% - 100%)    GENERAL: NAD, well-developed, 31y  EENT: EOMI, conjunctiva and sclera clear, No nasal obstruction or discharge  RESPIRATORY: Clear to auscultation bilaterally; No wheeze or crackles  CARDIOVASCULAR: Regular rate and rhythm; No murmurs, no pitting edema  GASTROINTESTINAL: Abdomen Soft, Nontender, Nondistended, +BS  MUSCULOSKELETAL:  No cyanosis, extremities grossly symmetrical  PSYCH: AAOx3, affect appropriate  NEUROLOGY: non-focal, cognition grossly intact, MAEE  SKIN: indurated ulcer noted on R breast w/ surrounding erythema localized under breast fold, no purulent drainage noted, indurated ulcer noted on L breast where site of cortisone injection was      ADMISSION SUMMARY  Patient is a 31y old Female who presents with a chief complaint of

## 2022-04-13 NOTE — DISCHARGE NOTE NURSING/CASE MANAGEMENT/SOCIAL WORK - PATIENT PORTAL LINK FT
You can access the FollowMyHealth Patient Portal offered by [facility] by registering at the following website: [email or web address]. By joining [facility]’s FollowMyHealth portal, you will also be able to view your health information using other applications (apps) compatible with our system.

## 2022-04-13 NOTE — CONSULT NOTE ADULT - SUBJECTIVE AND OBJECTIVE BOX
ABDONMARY JO MEDINA  31y, Female  Allergy: No Known Allergies      CHIEF COMPLAINT:       LOS  1d    HPI  HPI:  31 F pmhx DM and hydradenitis suppurativa w/ recurrent abscesses presents to ED for fever and worsening redness to R breast. s/p R breast I&D by Dermatologist Dr. Garcia earlier today. Also had cortisone injection into scar tissue of an old abscess scar of L breast 4 o-clock position.  Pt states she was started on doxycycline and she has taken 1 dose today. States her erythema is now extending up to areola. Denies chills, chest pain, palpitation, sob, abd pain, n/v/d/, black stool/bloody stool/urinary sxs.    In the ED, pt hemodynamically stable, HR 140s. Labs significant for WBC 12,     s/p cefepime, metronidazole, flagyl in the ED (12 Apr 2022 22:50)      INFECTIOUS DISEASE HISTORY:  ID consulted for breast abscess  on vancomycin     PMH  PAST MEDICAL & SURGICAL HISTORY:  Diabetes mellitus    Hydradenitis        FAMILY HISTORY      SOCIAL HISTORY  Social History:        ROS  ***    VITALS:  T(F): 99.8, Max: 99.8 (04-13-22 @ 05:14)  HR: 123  BP: 135/-  RR: 17Vital Signs Last 24 Hrs  T(C): 37.7 (13 Apr 2022 05:14), Max: 37.7 (13 Apr 2022 05:14)  T(F): 99.8 (13 Apr 2022 05:14), Max: 99.8 (13 Apr 2022 05:14)  HR: 123 (13 Apr 2022 05:14) (123 - 145)  BP: 135/- (13 Apr 2022 05:14) (130/65 - 140/90)  BP(mean): --  RR: 17 (13 Apr 2022 01:40) (17 - 18)  SpO2: 98% (13 Apr 2022 01:40) (98% - 100%)    PHYSICAL EXAM:  ***    TESTS & MEASUREMENTS:                        10.1   10.42 )-----------( 240      ( 13 Apr 2022 07:46 )             31.2     04-13    133<L>  |  98  |  8<L>  ----------------------------<  328<H>  3.7   |  20  |  0.5<L>    Ca    8.8      13 Apr 2022 07:46    TPro  6.2  /  Alb  3.8  /  TBili  0.4  /  DBili  x   /  AST  10  /  ALT  14  /  AlkPhos  90  04-13      LIVER FUNCTIONS - ( 13 Apr 2022 07:46 )  Alb: 3.8 g/dL / Pro: 6.2 g/dL / ALK PHOS: 90 U/L / ALT: 14 U/L / AST: 10 U/L / GGT: x                 Lactate, Blood: 0.9 mmol/L (04-13-22 @ 07:46)  Lactate, Blood: 1.4 mmol/L (04-12-22 @ 20:24)      INFECTIOUS DISEASES TESTING  COVID-19 PCR: NotDetec (04-12-22 @ 22:25)      INFLAMMATORY MARKERS      RADIOLOGY & ADDITIONAL TESTS:  I have personally reviewed the last Chest xray  CXR      CT      CARDIOLOGY TESTING  12 Lead ECG:   Ventricular Rate 142 BPM    Atrial Rate 142 BPM    P-R Interval 134 ms    QRS Duration 76 ms    Q-T Interval 288 ms    QTC Calculation(Bazett) 443 ms    P Axis 66 degrees    R Axis 46 degrees    T Axis 25 degrees    Diagnosis Line Sinus tachycardia  Otherwise normal ECG    Confirmed by Jay Pollack ([id number]) on 4/12/2022 7:56:00 PM (04-12-22 @ 19:34)      MEDICATIONS  dextrose 5%. 1000 IV Continuous <Continuous>  dextrose 50% Injectable 25 IV Push once  glucagon  Injectable 1 IntraMuscular once  heparin   Injectable 5000 SubCutaneous every 8 hours  insulin glargine Injectable (LANTUS) 21 SubCutaneous at bedtime  insulin lispro (ADMELOG) corrective regimen sliding scale  SubCutaneous Before meals and at bedtime  insulin lispro Injectable (ADMELOG) 8 SubCutaneous three times a day before meals  sodium chloride 0.9%. 1000 IV Continuous <Continuous>  vancomycin  IVPB 1750 IV Intermittent every 12 hours        ANTIBIOTICS:  vancomycin  IVPB 1750 milliGRAM(s) IV Intermittent every 12 hours      ALLERGIES:  No Known Allergies       MARY JO COPPOLA  31y, Female  Allergy: No Known Allergies      CHIEF COMPLAINT:       LOS  1d    HPI  HPI:  31 F pmhx DM and hydradenitis suppurativa w/ recurrent abscesses presents to ED for fever and worsening redness to R breast. s/p R breast I&D by Dermatologist Dr. Garcia earlier today. Also had cortisone injection into scar tissue of an old abscess scar of L breast 4 o-clock position.  Pt states she was started on doxycycline and she has taken 1 dose today. States her erythema is now extending up to areola. Denies chills, chest pain, palpitation, sob, abd pain, n/v/d/, black stool/bloody stool/urinary sxs.    In the ED, pt hemodynamically stable, HR 140s. Labs significant for WBC 12,     s/p cefepime, metronidazole, flagyl in the ED (12 Apr 2022 22:50)      INFECTIOUS DISEASE HISTORY:  ID consulted for breast abscess  on vancomycin   hx recurrent abscesses - breast, axilla, groin   4 mo post partum     recent doxy @ home     denies previous + cultures     PMH  PAST MEDICAL & SURGICAL HISTORY:  Diabetes mellitus    Hydradenitis        FAMILY HISTORY  non-contributory     SOCIAL HISTORY  Social History:   4 mo post partum, not breast feeding      ROS  General: Denies rigors, nightsweats  HEENT: Denies headache, rhinorrhea, sore throat, eye pain  CV: Denies CP, palpitations  PULM: Denies wheezing, hemoptysis  GI: Denies hematemesis, hematochezia, melena  : Denies discharge, hematuria  MSK: Denies arthralgias, myalgias  SKIN: as noted above   NEURO: Denies paresthesias, weakness  PSYCH: Denies depression, anxiety     VITALS:  T(F): 99.8, Max: 99.8 (04-13-22 @ 05:14)  HR: 123  BP: 135/-  RR: 17Vital Signs Last 24 Hrs  T(C): 37.7 (13 Apr 2022 05:14), Max: 37.7 (13 Apr 2022 05:14)  T(F): 99.8 (13 Apr 2022 05:14), Max: 99.8 (13 Apr 2022 05:14)  HR: 123 (13 Apr 2022 05:14) (123 - 145)  BP: 135/- (13 Apr 2022 05:14) (130/65 - 140/90)  BP(mean): --  RR: 17 (13 Apr 2022 01:40) (17 - 18)  SpO2: 98% (13 Apr 2022 01:40) (98% - 100%)    PHYSICAL EXAM:  Gen: NAD, resting in bed  HEENT: Normocephalic, atraumatic  Neck: supple, no lymphadenopathy  CV: Regular rate & regular rhythm  R breast with draining wound + purulence, healed wound on L   Lungs: decreased BS at bases, no fremitus  Abdomen: Soft, BS present  Ext: Warm, well perfused  Neuro: non focal, awake  Skin: no rash, no erythema  Lines: no phlebitis     TESTS & MEASUREMENTS:                        10.1   10.42 )-----------( 240      ( 13 Apr 2022 07:46 )             31.2     04-13    133<L>  |  98  |  8<L>  ----------------------------<  328<H>  3.7   |  20  |  0.5<L>    Ca    8.8      13 Apr 2022 07:46    TPro  6.2  /  Alb  3.8  /  TBili  0.4  /  DBili  x   /  AST  10  /  ALT  14  /  AlkPhos  90  04-13      LIVER FUNCTIONS - ( 13 Apr 2022 07:46 )  Alb: 3.8 g/dL / Pro: 6.2 g/dL / ALK PHOS: 90 U/L / ALT: 14 U/L / AST: 10 U/L / GGT: x                 Lactate, Blood: 0.9 mmol/L (04-13-22 @ 07:46)  Lactate, Blood: 1.4 mmol/L (04-12-22 @ 20:24)      INFECTIOUS DISEASES TESTING  COVID-19 PCR: NotDetec (04-12-22 @ 22:25)      INFLAMMATORY MARKERS      RADIOLOGY & ADDITIONAL TESTS:  I have personally reviewed the last Chest xray  CXR      CT      CARDIOLOGY TESTING  12 Lead ECG:   Ventricular Rate 142 BPM    Atrial Rate 142 BPM    P-R Interval 134 ms    QRS Duration 76 ms    Q-T Interval 288 ms    QTC Calculation(Bazett) 443 ms    P Axis 66 degrees    R Axis 46 degrees    T Axis 25 degrees    Diagnosis Line Sinus tachycardia  Otherwise normal ECG    Confirmed by Jay Pollack ([id number]) on 4/12/2022 7:56:00 PM (04-12-22 @ 19:34)      MEDICATIONS  dextrose 5%. 1000 IV Continuous <Continuous>  dextrose 50% Injectable 25 IV Push once  glucagon  Injectable 1 IntraMuscular once  heparin   Injectable 5000 SubCutaneous every 8 hours  insulin glargine Injectable (LANTUS) 21 SubCutaneous at bedtime  insulin lispro (ADMELOG) corrective regimen sliding scale  SubCutaneous Before meals and at bedtime  insulin lispro Injectable (ADMELOG) 8 SubCutaneous three times a day before meals  sodium chloride 0.9%. 1000 IV Continuous <Continuous>  vancomycin  IVPB 1750 IV Intermittent every 12 hours        ANTIBIOTICS:  vancomycin  IVPB 1750 milliGRAM(s) IV Intermittent every 12 hours      ALLERGIES:  No Known Allergies

## 2022-04-13 NOTE — DISCHARGE NOTE PROVIDER - NSDCCPCAREPLAN_GEN_ALL_CORE_FT
PRINCIPAL DISCHARGE DIAGNOSIS  Diagnosis: Cellulitis and abscess  Assessment and Plan of Treatment: Take the prescribed antibiotic medicine you are given as directed until it is gone. Take it even if you feel better. It treats the infection and stops it from  Not taking all the medicine can make future infections hard to treat. Keep the infected area clean. When possible, raise the infected area above the level of your heart. This helps keep swelling down. Apply clean bandages as advised. Wash your hands often to prevent spreading the infection. In the future, wash your hands before and after you touch cuts, scratches, or bandages. This will help prevent infection.   Call your doctor or returnt o the emergency room or call 911 immediately if you have any of the following: Difficulty or pain when moving the joints above or below the infected area, Discharge or pus draining from the area, rever of 100.4°F (38°C) or higher, or as directed by your healthcare provider, pain that gets worse in or around the infected site, redness that gets worse in or around the infected area, particularly if the area of redness expands to a wider area, shaking chills, swelling of the infected area, vomiting.         PRINCIPAL DISCHARGE DIAGNOSIS  Diagnosis: Cellulitis and abscess  Assessment and Plan of Treatment: Take the prescribed antibiotic medicine you are given as directed until it is gone. Take it even if you feel better. It treats the infection and stops it from  Not taking all the medicine can make future infections hard to treat. Keep the infected area clean. When possible, raise the infected area above the level of your heart. This helps keep swelling down. Apply clean bandages as advised. Wash your hands often to prevent spreading the infection. In the future, wash your hands before and after you touch cuts, scratches, or bandages. This will help prevent infection.   Call your doctor or returnt o the emergency room or call 911 immediately if you have any of the following: Difficulty or pain when moving the joints above or below the infected area, Discharge or pus draining from the area, rever of 100.4°F (38°C) or higher, or as directed by your healthcare provider, pain that gets worse in or around the infected site, redness that gets worse in or around the infected area, particularly if the area of redness expands to a wider area, shaking chills, swelling of the infected area, vomiting.        SECONDARY DISCHARGE DIAGNOSES  Diagnosis: DM (diabetes mellitus)  Assessment and Plan of Treatment: Diabetes is a chronic condition caused by high blood sugar levels. Your blood sugar levels become high because your body does not have enough insulin. Insulin helps move sugar out of the blood so it can be used for energy. Increased sugar in your blood can cause problems in several organs of your body including but not limited to your blood vessels, your kidneys, your brain, and your eyes. The lack of insulin forces your body to use fat instead of sugar for energy. This can be dangerous if not controlled.  The best way to prevent problems from Diabetes is to control your blood sugars.  Monitor your blood sugar levels closely. Administer Insulin as directed by your physician.   Your A1c this admission was 9.2%. Follow up with your primary care doctor about further management for your diabetes.

## 2022-04-13 NOTE — DISCHARGE NOTE PROVIDER - CARE PROVIDER_API CALL
JUANCHO MICHAELS  Internal Medicine  [address] [place] [address]  Phone: ([phone]  Fax: ()-  Follow Up Time: 2 weeks

## 2022-04-13 NOTE — CONSULT NOTE ADULT - ASSESSMENT
ASSESSMENT  31 F pmhx DM and hydradenitis suppurativa w/ recurrent abscesses presents to ED for fever and worsening redness to R breast. s/p R breast I&D by Dermatologist Dr. Garcia earlier today. Also had cortisone injection into scar tissue of an old abscess scar of L breast 4 o-clock position.  Pt states she was started on doxycycline and she has taken 1 dose today.     IMPRESSION  #R breast abscess    #Tachycardic on admission WBC 12  #Hyponatremia   #Super Morbid Obesity BMI (kg/m2): 51.4  #DM   Creatinine, Serum: 0.5 (04-13-22 @ 07:46)    Weight (kg): 127.4 (04-13-22 @ 05:14)    RECOMMENDATIONS  This is an incomplete consult note. All final recommendations to follow after interview and examination of the patient. Please follow recommendations noted below.    If any questions, please call or send a message on [employer] Teams  Please continue to update ID with any pertinent new laboratory or radiographic findings  Spectra [id number]     ASSESSMENT  31 F pmhx DM and hydradenitis suppurativa w/ recurrent abscesses presents to ED for fever and worsening redness to R breast. s/p R breast I&D by Dermatologist Dr. Garcia earlier today. Also had cortisone injection into scar tissue of an old abscess scar of L breast 4 o-clock position.  Pt states she was started on doxycycline and she has taken 1 dose today.     IMPRESSION  #R breast abscess  #Tachycardic on admission WBC 12 - no sepsis   #Hyponatremia   #Super Morbid Obesity BMI (kg/m2): 51.4  #DM   Creatinine, Serum: 0.5 (04-13-22 @ 07:46)    Weight (kg): 127.4 (04-13-22 @ 05:14)    RECOMMENDATIONS  - Send WCX  - PO bactrim 1 DS BID 10 days  - was taking doxy, can continue    If any questions, please call or send a message on [employer] Teams  Please continue to update ID with any pertinent new laboratory or radiographic findings  Spectra [id number]

## 2022-04-13 NOTE — PROGRESS NOTE ADULT - ASSESSMENT
31 F pmhx DM and hydradenitis suppurativa w/ recurrent abscesses/cellulitis presents to ED for fever and worsening redness to R breast. s/p R breast I&D by Dermatologist Dr. Garcia. Also had cortisone injection into scar tissue of an old abscess scar of L breast 4 o-clock position.  Pt states she was started on doxycycline and she has taken 1 dose yet. States her erythema is now extending up to areola. Denies chills, chest pain, palpitation, sob, abd pain, n/v/d/, black stool/bloody stool/urinary sxs.    # Rt breast cellulitis w/ sm ulcer; min drainage; tech meets criteria for sepsis on admit (WBC, HR) - though looks well  s/p small I&D at derm office   no packing needed for wound, which is very small - can cover w/ 1 bandaid and triple abx oint q12-24  can d/c vancomycin  I spoke w/ ID consult   abx: doxy 100mg po q12 + bactrim ds 1 po q12 for 1 wk  Pain: tylenol or advil  imaging not needed; further sx not needed  f/u Bcx as outpt  MRSA nares neg  rt breast wound cx done today - can f/u as outpt (pt says wound cx are always neg w/ her)  the uncontrolled DM is the cause of the skin infections  pt can have diflucan 150mg po x1 (in case fungal yeast infection develops, which she has had before)    # DM T2 w/ Hyperglycemia - this is cause of infections  diabetic diet  Keep FS <180  HbA1C - 5.5 last month per patient (pt is RN, 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

## 2022-04-18 ENCOUNTER — TRANSCRIPTION ENCOUNTER (OUTPATIENT)
Age: 32
End: 2022-04-18

## 2022-04-18 LAB
CULTURE RESULTS: SIGNIFICANT CHANGE UP
CULTURE RESULTS: SIGNIFICANT CHANGE UP
SPECIMEN SOURCE: SIGNIFICANT CHANGE UP
SPECIMEN SOURCE: SIGNIFICANT CHANGE UP

## 2022-04-19 DIAGNOSIS — N61.0 MASTITIS WITHOUT ABSCESS: ICD-10-CM

## 2022-04-19 DIAGNOSIS — E66.01 MORBID (SEVERE) OBESITY DUE TO EXCESS CALORIES: ICD-10-CM

## 2022-04-19 DIAGNOSIS — N61.1 ABSCESS OF THE BREAST AND NIPPLE: ICD-10-CM

## 2022-04-19 DIAGNOSIS — Z79.4 LONG TERM (CURRENT) USE OF INSULIN: ICD-10-CM

## 2022-04-19 DIAGNOSIS — E87.1 HYPO-OSMOLALITY AND HYPONATREMIA: ICD-10-CM

## 2022-04-19 DIAGNOSIS — D64.9 ANEMIA, UNSPECIFIED: ICD-10-CM

## 2022-04-19 DIAGNOSIS — E11.65 TYPE 2 DIABETES MELLITUS WITH HYPERGLYCEMIA: ICD-10-CM

## 2022-04-19 DIAGNOSIS — Z20.822 CONTACT WITH AND (SUSPECTED) EXPOSURE TO COVID-19: ICD-10-CM

## 2022-04-19 DIAGNOSIS — A41.9 SEPSIS, UNSPECIFIED ORGANISM: ICD-10-CM

## 2022-10-20 ENCOUNTER — NON-APPOINTMENT (OUTPATIENT)
Age: 32
End: 2022-10-20

## 2023-08-31 NOTE — ED PROVIDER NOTE - INPATIENT RESIDENT/ACP NOTIFIED DATE
12-Apr-2022 22:25 [facility] Pregnancy And Lactation Text: There is insufficient data to evaluate drug-associated risk for major birth defects, miscarriage, or other adverse maternal or fetal outcomes.  There is a pregnancy registry that monitors pregnancy outcomes in pregnant persons exposed to the medication during pregnancy.  It is unknown if this medication is excreted in breast milk.  Do not breastfeed during treatment and for about 4 weeks after the last dose.

## 2023-09-13 ENCOUNTER — NON-APPOINTMENT (OUTPATIENT)
Age: 33
End: 2023-09-13

## 2023-09-22 ENCOUNTER — NON-APPOINTMENT (OUTPATIENT)
Age: 33
End: 2023-09-22

## 2024-12-10 ENCOUNTER — NON-APPOINTMENT (OUTPATIENT)
Age: 34
End: 2024-12-10

## 2024-12-31 ENCOUNTER — NON-APPOINTMENT (OUTPATIENT)
Age: 34
End: 2024-12-31

## 2025-03-10 ENCOUNTER — NON-APPOINTMENT (OUTPATIENT)
Age: 35
End: 2025-03-10

## 2025-03-18 ENCOUNTER — NON-APPOINTMENT (OUTPATIENT)
Age: 35
End: 2025-03-18